# Patient Record
Sex: MALE | Race: WHITE | URBAN - METROPOLITAN AREA
[De-identification: names, ages, dates, MRNs, and addresses within clinical notes are randomized per-mention and may not be internally consistent; named-entity substitution may affect disease eponyms.]

---

## 2018-11-04 ENCOUNTER — HOSPITAL ENCOUNTER (OUTPATIENT)
Dept: ONCOLOGY | Facility: HOSPITAL | Age: 53
End: 2018-11-05
Attending: INTERNAL MEDICINE | Admitting: INTERNAL MEDICINE

## 2018-11-04 LAB
ABS NEUT (OLG): 15.34 X10(3)/MCL (ref 2.1–9.2)
ALBUMIN SERPL-MCNC: 3.5 GM/DL (ref 3.4–5)
ALBUMIN/GLOB SERPL: 0.9 {RATIO}
ALP SERPL-CCNC: 95 UNIT/L (ref 50–136)
ALT SERPL-CCNC: 31 UNIT/L (ref 12–78)
AMPHET UR QL SCN: NORMAL
APPEARANCE, UA: CLEAR
AST SERPL-CCNC: 23 UNIT/L (ref 15–37)
BACTERIA SPEC CULT: ABNORMAL /HPF
BARBITURATE SCN PRESENT UR: NORMAL
BASOPHILS # BLD AUTO: 0 X10(3)/MCL (ref 0–0.2)
BASOPHILS NFR BLD AUTO: 0 %
BENZODIAZ UR QL SCN: NORMAL
BILIRUB SERPL-MCNC: 0.4 MG/DL (ref 0.2–1)
BILIRUB UR QL STRIP: NEGATIVE
BILIRUBIN DIRECT+TOT PNL SERPL-MCNC: 0.1 MG/DL (ref 0–0.2)
BILIRUBIN DIRECT+TOT PNL SERPL-MCNC: 0.3 MG/DL (ref 0–0.8)
BUN SERPL-MCNC: 12 MG/DL (ref 7–18)
CALCIUM SERPL-MCNC: 9.1 MG/DL (ref 8.5–10.1)
CANNABINOIDS UR QL SCN: NORMAL
CHLORIDE SERPL-SCNC: 107 MMOL/L (ref 98–107)
CO2 SERPL-SCNC: 31 MMOL/L (ref 21–32)
COCAINE UR QL SCN: NORMAL
COLOR UR: YELLOW
CREAT SERPL-MCNC: 1.06 MG/DL (ref 0.7–1.3)
EOSINOPHIL # BLD AUTO: 0.1 X10(3)/MCL (ref 0–0.9)
EOSINOPHIL NFR BLD AUTO: 0 %
ERYTHROCYTE [DISTWIDTH] IN BLOOD BY AUTOMATED COUNT: 12.4 % (ref 11.5–17)
ETHANOL SERPL-MCNC: <3 MG/DL (ref 0–3)
GLOBULIN SER-MCNC: 3.7 GM/DL (ref 2.4–3.5)
GLUCOSE (UA): NEGATIVE
GLUCOSE SERPL-MCNC: 132 MG/DL (ref 74–106)
HCT VFR BLD AUTO: 47.6 % (ref 42–52)
HGB BLD-MCNC: 15.6 GM/DL (ref 14–18)
HGB UR QL STRIP: ABNORMAL
KETONES UR QL STRIP: ABNORMAL
LEUKOCYTE ESTERASE UR QL STRIP: NEGATIVE
LYMPHOCYTES # BLD AUTO: 2.5 X10(3)/MCL (ref 0.6–4.6)
LYMPHOCYTES NFR BLD AUTO: 13 %
MCH RBC QN AUTO: 32.6 PG (ref 27–31)
MCHC RBC AUTO-ENTMCNC: 32.8 GM/DL (ref 33–36)
MCV RBC AUTO: 99.4 FL (ref 80–94)
MONOCYTES # BLD AUTO: 1.2 X10(3)/MCL (ref 0.1–1.3)
MONOCYTES NFR BLD AUTO: 6 %
NEUTROPHILS # BLD AUTO: 15.34 X10(3)/MCL (ref 2.1–9.2)
NEUTROPHILS NFR BLD AUTO: 80 %
NITRITE UR QL STRIP: NEGATIVE
OPIATES UR QL SCN: NORMAL
PCP UR QL: NORMAL
PH UR STRIP.AUTO: 6.5 [PH] (ref 5–7.5)
PH UR STRIP: 6.5 [PH] (ref 5–9)
PLATELET # BLD AUTO: 197 X10(3)/MCL (ref 130–400)
PMV BLD AUTO: 11.2 FL (ref 9.4–12.4)
POTASSIUM SERPL-SCNC: 5 MMOL/L (ref 3.5–5.1)
PROT SERPL-MCNC: 7.2 GM/DL (ref 6.4–8.2)
PROT UR QL STRIP: NEGATIVE
RBC # BLD AUTO: 4.79 X10(6)/MCL (ref 4.7–6.1)
RBC #/AREA URNS HPF: 5 /HPF (ref 0–2)
SODIUM SERPL-SCNC: 143 MMOL/L (ref 136–145)
SP GR FLD REFRACTOMETRY: 1.02 (ref 1–1.03)
SP GR UR STRIP: 1.02 (ref 1–1.03)
SQUAMOUS EPITHELIAL, UA: ABNORMAL
UROBILINOGEN UR STRIP-ACNC: 1
WBC # SPEC AUTO: 19.3 X10(3)/MCL (ref 4.5–11.5)
WBC #/AREA URNS HPF: ABNORMAL /HPF

## 2018-11-05 LAB
ABS NEUT (OLG): 5.54 X10(3)/MCL (ref 2.1–9.2)
ALBUMIN SERPL-MCNC: 3.1 GM/DL (ref 3.4–5)
ALBUMIN/GLOB SERPL: 1.1 RATIO (ref 1.1–2)
ALP SERPL-CCNC: 91 UNIT/L (ref 50–136)
ALT SERPL-CCNC: 25 UNIT/L (ref 12–78)
AST SERPL-CCNC: 25 UNIT/L (ref 15–37)
BASOPHILS # BLD AUTO: 0 X10(3)/MCL (ref 0–0.2)
BASOPHILS NFR BLD AUTO: 0 %
BILIRUB SERPL-MCNC: 0.7 MG/DL (ref 0.2–1)
BILIRUBIN DIRECT+TOT PNL SERPL-MCNC: 0.2 MG/DL (ref 0–0.5)
BILIRUBIN DIRECT+TOT PNL SERPL-MCNC: 0.5 MG/DL (ref 0–0.8)
BUN SERPL-MCNC: 10 MG/DL (ref 7–18)
CALCIUM SERPL-MCNC: 8.5 MG/DL (ref 8.5–10.1)
CHLORIDE SERPL-SCNC: 107 MMOL/L (ref 98–107)
CO2 SERPL-SCNC: 29 MMOL/L (ref 21–32)
CREAT SERPL-MCNC: 0.72 MG/DL (ref 0.7–1.3)
EOSINOPHIL # BLD AUTO: 0.1 X10(3)/MCL (ref 0–0.9)
EOSINOPHIL NFR BLD AUTO: 1 %
ERYTHROCYTE [DISTWIDTH] IN BLOOD BY AUTOMATED COUNT: 12.6 % (ref 11.5–17)
GLOBULIN SER-MCNC: 2.7 GM/DL (ref 2.4–3.5)
GLUCOSE SERPL-MCNC: 93 MG/DL (ref 74–106)
HCT VFR BLD AUTO: 44.9 % (ref 42–52)
HGB BLD-MCNC: 14.6 GM/DL (ref 14–18)
LYMPHOCYTES # BLD AUTO: 2.3 X10(3)/MCL (ref 0.6–4.6)
LYMPHOCYTES NFR BLD AUTO: 26 %
MAGNESIUM SERPL-MCNC: 2.3 MG/DL (ref 1.8–2.4)
MCH RBC QN AUTO: 32.2 PG (ref 27–31)
MCHC RBC AUTO-ENTMCNC: 32.5 GM/DL (ref 33–36)
MCV RBC AUTO: 98.9 FL (ref 80–94)
MONOCYTES # BLD AUTO: 0.7 X10(3)/MCL (ref 0.1–1.3)
MONOCYTES NFR BLD AUTO: 8 %
NEUTROPHILS # BLD AUTO: 5.54 X10(3)/MCL (ref 2.1–9.2)
NEUTROPHILS NFR BLD AUTO: 64 %
PHOSPHATE SERPL-MCNC: 3.2 MG/DL (ref 2.5–4.9)
PLATELET # BLD AUTO: 173 X10(3)/MCL (ref 130–400)
PMV BLD AUTO: 11 FL (ref 9.4–12.4)
POTASSIUM SERPL-SCNC: 4.1 MMOL/L (ref 3.5–5.1)
PROT SERPL-MCNC: 5.8 GM/DL (ref 6.4–8.2)
RBC # BLD AUTO: 4.54 X10(6)/MCL (ref 4.7–6.1)
SODIUM SERPL-SCNC: 142 MMOL/L (ref 136–145)
WBC # SPEC AUTO: 8.6 X10(3)/MCL (ref 4.5–11.5)

## 2018-11-11 LAB
FINAL CULTURE: NORMAL
FINAL CULTURE: NORMAL

## 2022-04-11 ENCOUNTER — HISTORICAL (OUTPATIENT)
Dept: ADMINISTRATIVE | Facility: HOSPITAL | Age: 57
End: 2022-04-11

## 2022-04-27 VITALS
HEIGHT: 71 IN | DIASTOLIC BLOOD PRESSURE: 78 MMHG | SYSTOLIC BLOOD PRESSURE: 126 MMHG | BODY MASS INDEX: 25.65 KG/M2 | WEIGHT: 183.19 LBS

## 2022-04-30 NOTE — ED PROVIDER NOTES
Patient:   Sbe Street             MRN: [id number]            FIN: [id number]               Age:   53 years     Sex:  Male     :  1965   Associated Diagnoses:   Seizure-like activity   Author:   Santiago Davila MD      Basic Information   Time seen: Date & time 2018 05:28:00.   History source: Patient, spouse, EMS.   Arrival mode: Ambulance.   History limitation: None.      History of Present Illness   Presents with 54 y/o white male presents to ED via EMS following a seizure. Wife reports the patient was laying in bed when he began to have jerky movements then proceeded to open his eyes however he would not focus on her. Wife reports the patient was incoherent when EMS arrived. Patient reports neck pain, back pain, calf pain, and chest pain. Patient reports exacerbating factors include deep breathing. Patient denies hx of seizures, CVA, and head trauma.  and Patient works as a  offshore.  The onset was just prior to arrival.  The occurrence was single episode.  Witnessed: by family.  The location where the incident occurred was at home.  The character of symptoms is generalized.  The Postictal symptoms is confusion.  The exacerbating factor is none.  Risk factors consist of none.  Therapy today: emergency medical services.  Associated symptoms: chest pain, confusion and back pain.  Associated injury: bit tongue.  Additional history: none.        Review of Systems   Constitutional symptoms:  No fever, no chills, no sweats, no weakness.    Skin symptoms:  No rash, no pruritus.    Eye symptoms:  Vision unchanged.   ENMT symptoms:  No sore throat, no nasal congestion.    Respiratory symptoms:  No shortness of breath, no cough.    Cardiovascular symptoms:  Chest pain, no palpitations, no tachycardia.    Gastrointestinal symptoms:  No abdominal pain, no nausea, no vomiting, no diarrhea, no constipation.    Genitourinary symptoms:  No dysuria, no hematuria.    Musculoskeletal  symptoms:  Back pain, No Muscle pain,    Neurologic symptoms:  Seizure, no headache, no dizziness, no numbness, no tingling, no weakness.              Additional review of systems information: All other systems reviewed and otherwise negative, All systems reviewed as documented in chart.      Health Status   Allergies: No known allergies.      Past Medical/ Family/ Social History   Medical history: Negative.   Surgical history: Negative.   Family history:     seizure.   Social history:    Social & Psychosocial Habits    Tobacco  11/04/2018  Use: Unknown if ever smoked    Smoking Cessation Counseling No.      Physical Examination               Vital Signs   Vital Signs   11/4/2018 1:43 CDT       Temperature Oral          36.5 DegC                             Temperature Oral (calculated)             97.70 DegF                             Peripheral Pulse Rate     97 bpm                             Respiratory Rate          16 br/min                             SpO2                      95 %                             Systolic Blood Pressure   121 mmHg                             Diastolic Blood Pressure  74 mmHg  .   Measurements   11/4/2018 1:43 CDT       Weight Dosing             84 kg                             Weight Measured           84 kg                             Weight Measured and Calculated in Lbs     185.19 lb  .   Basic Oxygen Information   11/4/2018 5:00 CST       SpO2                      95 %                             Oxygen Therapy            Room air  .   General:  Alert, no acute distress.    Howe coma scale:  Eye response: 4 /4, verbal response: 5 /5, motor response: 6 /6, Total score: Total score: 15.    Neurological:  Alert and oriented to person, place, time, and situation, No focal neurological deficit observed, normal motor observed.    Skin:  Warm, dry, no rash.    Head:  Normocephalic, atraumatic.    Neck:  Supple, trachea midline, no tenderness.    Eye:  Extraocular movements are  intact, vision unchanged.    Ears, nose, mouth and throat:  Oral mucosa moist, abrasion to right distal aspect of tongue.    Respiratory:  Lungs are clear to auscultation, respirations are non-labored, breath sounds are equal, Symmetrical chest wall expansion.    Cardiovascular:  Regular rate and rhythm, No murmur, Normal peripheral perfusion.    Gastrointestinal:  Soft, Nontender, Non distended, Normal bowel sounds, No organomegaly.    Musculoskeletal:  Normal ROM, normal strength.    Psychiatric:  Cooperative, appropriate mood & affect.       Medical Decision Making   Documents reviewed:  Emergency department nurses' notes.   Orders  Launch Order Profile (Selected)   Inpatient Orders  Ordered  Discharge Planning Ongoing Assessment: 11/07/18 9:00:00 CST, q3day  Ordered (Exam Completed)  CT Head W/O Contrast: Stat, 11/04/18 2:30:00 CST, Seizures, seizure, None, Stretcher, Rad Type, Schedule this test, 11/04/18 2:30:00 CST  Completed  Automated Diff: Routine collect, 11/04/18 1:14:00 CST, Blood, Collected, by CLIENT, Stop date 11/04/18 1:14:00 CST, Lab Collect, Venous Draw, Print Label By Order Location  CBC w/ Auto Diff: Routine collect, 11/04/18 1:14:00 CST, Blood, Collected, by CLIENT, Stop date 11/04/18 1:14:00 CST, Lab Collect, Venous Draw, Print Label By Order Location  Comprehensive Metabolic Panel: Routine collect, 11/04/18 1:14:00 CST, Blood, Collected, by CLIENT, Stop date 11/04/18 1:14:00 CST, Lab Collect, Venous Draw, Print Label By Order Location  EKG Adult 12 Lead: 11/04/18 3:02:00 CST, Stat, Once, 11/04/18 3:02:00 CST, -1, -1, 11/04/18 3:02:00 CST  Estimated Glomerular Filtration Rate: Routine collect, 11/04/18 1:14:00 CST, Blood, Collected, by CLIENT, Stop date 11/04/18 1:14:00 CST, Lab Collect, Venous Draw, Print Label By Order Location  Ethanol Level: Routine collect, 11/04/18 1:14:00 CST, Blood, Collected, by CLIENT, Stop date 11/04/18 1:14:00 CST, Lab Collect, Venous Draw, Print Label By Order  Location.   Electrocardiogram:  Time 11/4/2018 07:02:00, rate 88, normal sinus rhythm, No ST-T changes, no ectopy, normal NM & QRS intervals, EP Interp.    Results review:  Lab results : Lab View   11/4/2018 1:14 CST       Sodium Lvl                143 mmol/L                             Potassium Lvl             5.0 mmol/L                             Chloride                  107 mmol/L                             CO2                       31.0 mmol/L                             Calcium Lvl               9.1 mg/dL                             Glucose Lvl               132 mg/dL  HI                             BUN                       12.0 mg/dL                             Creatinine                1.06 mg/dL                             eGFR-AA                   >60 mL/min/1.73 m2  NA                             eGFR-NINA                  >60 mL/min/1.73 m2  NA                             Bili Total                0.4 mg/dL                             Bili Direct               0.10 mg/dL                             Bili Indirect             0.30 mg/dL                             AST                       23 unit/L                             ALT                       31 unit/L                             Alk Phos                  95 unit/L                             Total Protein             7.2 gm/dL                             Albumin Lvl               3.50 gm/dL                             Globulin                  3.70 gm/dL  HI                             A/G Ratio                 0.9  NA                             WBC                       19.3 x10(3)/mcL  HI                             RBC                       4.79 x10(6)/mcL                             Hgb                       15.6 gm/dL                             Hct                       47.6 %                             Platelet                  197 x10(3)/mcL                             MCV                       99.4 fL  HI                              MCH                       32.6 pg  HI                             MCHC                      32.8 gm/dL  LOW                             RDW                       12.4 %                             MPV                       11.2 fL                             Abs Neut                  15.34 x10(3)/mcL  HI                             Neutro Auto               80 %  NA                             Lymph Auto                13 %  NA                             Mono Auto                 6 %  NA                             Eos Auto                  0 %  NA                             Abs Eos                   0.1 x10(3)/mcL                             Basophil Auto             0 %  NA                             Abs Neutro                15.34 x10(3)/mcL  HI                             Abs Lymph                 2.5 x10(3)/mcL                             Abs Mono                  1.2 x10(3)/mcL                             Abs Baso                  0.0 x10(3)/mcL                             Ethanol Lvl               <3.0 mg/dL  .   Radiology results:  Reviewed radiologist's report, No acute intracranial changes seen per radiology.       Reexamination/ Reevaluation   Time: 11/4/2018 06:13:00 .   Course: well controlled, Patient is alert and oriented, he is in no apparent distress., He does complain of some mild chest pain with inspiration.  He denies any shortness breath.  No nausea vomiting., UA is pending at this time.      Impression and Plan   Diagnosis   Seizure-like activity ([id number] R56.9)      Calls-Consults   -  11/4/2018 06:35:00 , Augusto WOODARD, Tracey KNAPP, Will see in consultation is admitted, get MRI with and without contrast with epilepsy protocol and EEG, does not recommend anti-seizure medicine at this time..    -  11/4/2018 07:02:00 , Yadira CISNEROS, okay to admit.    Plan   Condition: Stable.    Disposition: Admit time  11/4/2018 07:04:00, Place in Observation Unit.    Counseled: Patient, Family, Regarding  diagnosis, Regarding diagnostic results, Regarding treatment plan, Patient indicated understanding of instructions.    Notes: I, Davina Green, acted solely as a scribe for and in the presence of Dr. Davila who performed the service. , I acknowledged that the documentation on this chart was provided by a scribe on the date of service noted above and that the documentation in the chart accurately reflects work and decisions made by me alone..

## 2022-04-30 NOTE — H&P
Patient:   Seb Street             MRN: [id number]            FIN: [id number]               Age:   53 years     Sex:  Male     :  1965   Associated Diagnoses:   None   Author:   Liza Doll MD      Basic Information   Source of history:  Self, Family member, Medical record.    Present at bedside:  Family member, Medical personnel.    Referral source:  Emergency department.    History limitation:  None.    Provider information/ cc:    PCP: NONE  ADVANCED DIRECTIVES: NONE  CODE STATUS: FULL.       Chief Complaint   2018 1:43 CDT       SEIZURE WHILE IN BED, GCS=15, NO PMH, C/O CP UPON TAKING DEEP BREATH( TRIAGE COPIED FROM DOWN TIME FORM ORIGINALLYWRITTEN BY SHAYLA EVANS)      History of Present Illness   53-year-old  male presents to the ED via EMS status post acute seizure activity while in bed.  Family reported in the ED patient was in the bed sleeping when he began to have jerky movements to his upper and lower extremities with his eyes open and was nonverbal at that time.  911 was called for ED transport.  No reports of new foods or medications.  No reports of chest pain, shortness of breath, respiratory failure, nausea, vomiting, diarrhea, fever, chills, or any sick contacts.  Patient does not have a history of seizures, CVA, or any traumatic head or brain injury in the past.  Post seizure activity patient was reported to be confused however he is awake alert oriented ×4 at the time of rounds.  Patient does have complaints of tongue pain which it is noted patient did bite his tongue; he also reports of neck pain, back pain, pain to his lower extremities, and reproducible pleuritic chest pain. No acute intracranial process identified.  Chest x-ray done revealed no acute pulmonary disease.  Neurology services were consulted per ED provider with recommendations on no anti-epileptic medication ; orders for MRI of the brain with and without contrast and EEG were done.  Patient reports he  "smokes one pack a cigarettes a day, he denies any illicit drug use; he does report heavy alcohol use on a daily basis in the past however he reports he has not drank daily in over 10+ years.  Patient does admit to drinking "a lot of beers" yesterday while watching the football game.  Patient is admitted to hospital medicine services for further management.  Neurology services have been consulted.  VS /66 pulse 62 respirations 18 temperature 36.8°C O2 saturation 98% on room air      Review of Systems   Except as document, all other systems reviewed and negative      Health Status   Allergies:    Allergic Reactions (Selected)  No Known Allergies   Current medications:  (Selected)   Inpatient Medications  Ordered  Sodium Chloride 0.9% intravenous solution 1,000 mL: 1,000 mL, 1,000 mL, IV, 60 mL/hr, start date 11/04/18 10:21:00 CST  Zofran: 4 mg, form: Injection, IV Push, q4hr PRN for nausea, first dose 11/04/18 10:21:00 CST, choose first if ordered with other treatments for nausea  labetalol: 20 mg, form: Soln, IV Push, q2hr PRN for hypertension, first dose 11/04/18 10:21:00 CST, SBP > 160  and/or DBP > 90  not to exceed 300mg/24hrs      Histories   Past Medical History:    No active or resolved past medical history items have been selected or recorded.   Family History:   Negative to present medical condition   Procedure history:   negative   Social History     Drinks alcohol socially   Denies any illicit drug use  Smokes 1 pack of cigarettes a day  Lives with family.        Physical Examination      Vital Signs (last 24 hrs)_____  Last Charted___________  Temp Oral     36.8 DegC  (NOV 04 10:10)  Heart Rate Peripheral   62 bpm  (NOV 04 10:10)  Resp Rate         18 br/min  (NOV 04 10:10)  SBP      118 mmHg  (NOV 04 10:10)  DBP      66 mmHg  (NOV 04 10:10)  SpO2      98 %  (NOV 04 10:10)  Weight      83 kg  (NOV 04 09:27)  Height      177.8 cm  (NOV 04 09:27)  BMI      26.26  (NOV 04 09:27)   General:  Alert and " oriented, No acute distress, Appears stated age, nontoxic appearance; family member at bedside.    Eye:  Pupils are equal, round and reactive to light, Extraocular movements are intact, Normal conjunctiva, Vision unchanged.    HENT:  Normocephalic, Oral mucosa is moist, No pharyngeal erythema.    Neck:  Supple, Non-tender, No jugular venous distention.    Respiratory:  Lungs are clear to auscultation, Respirations are non-labored, Breath sounds are equal, Symmetrical chest wall expansion.    Cardiovascular:  Normal rate, Regular rhythm, S1, S2, No gallop, Normal peripheral perfusion.         Capillary refill: Less than 2 seconds.    Gastrointestinal:  Soft, Non-tender, Non-distended, Normal bowel sounds.    Genitourinary:  No costovertebral angle tenderness.    Musculoskeletal:  Moves upper and lower extremities freely and on command.    Integumentary:  Warm, Dry, Pink.    Neurologic:  Alert, Oriented, Normal sensory, Normal motor function, No focal deficits, Cranial Nerves II-XII are grossly intact.    Psychiatric:  Cooperative, Appropriate mood & affect.    Cognition and Speech:  Speech clear and coherent.       Review / Management   Laboratory Results   Today's Lab Results : PowerNote Discrete Results   11/4/2018 6:23 CST       UA Appear                 CLEAR                             UA Color                  YELLOW                             UA Spec Grav              1.021                             UA Bili                   Negative                             UA pH                     6.5                             UA Urobilinogen           1.0                             UA Blood                  Trace                             UA Glucose                Negative                             UA Ketones                Trace                             UA Protein                Negative                             UA Nitrite                Negative                             UA Leuk Est                Negative                             UA WBC                    NONE SEEN /HPF                             UA RBC                    5 /HPF  HI                             UA Bacteria               NONE SEEN /HPF                             UA Squam Epithelial       NONE SEEN                             U pH                      6.5                             U Spec Grav               1.021                             U Amph Scr                NEG                             U Radha Scr                NEG                             U Benzodia Scr            NEG                             U Cannab Scr              NEG                             U Cocaine Scr             NEG                             U Opiate Scr              NEG                             U Phencyclidine Scr       NEG    11/4/2018 1:14 CST       WBC                       19.3 x10(3)/mcL  HI                             RBC                       4.79 x10(6)/mcL                             Hgb                       15.6 gm/dL                             Hct                       47.6 %                             Platelet                  197 x10(3)/mcL                             MCV                       99.4 fL  HI                             MCH                       32.6 pg  HI                             MCHC                      32.8 gm/dL  LOW                             RDW                       12.4 %                             MPV                       11.2 fL                             Abs Neut                  15.34 x10(3)/mcL  HI                             Neutro Auto               80 %  NA                             Lymph Auto                13 %  NA                             Mono Auto                 6 %  NA                             Eos Auto                  0 %  NA                             Abs Eos                   0.1 x10(3)/mcL                             Basophil Auto             0 %  NA                              Abs Neutro                15.34 x10(3)/mcL  HI                             Abs Lymph                 2.5 x10(3)/mcL                             Abs Mono                  1.2 x10(3)/mcL                             Abs Baso                  0.0 x10(3)/mcL                             Sodium Lvl                143 mmol/L                             Potassium Lvl             5.0 mmol/L                             Chloride                  107 mmol/L                             CO2                       31.0 mmol/L                             Calcium Lvl               9.1 mg/dL                             Glucose Lvl               132 mg/dL  HI                             BUN                       12.0 mg/dL                             Creatinine                1.06 mg/dL                             eGFR-AA                   >60 mL/min/1.73 m2  NA                             eGFR-NINA                  >60 mL/min/1.73 m2  NA                             Bili Total                0.4 mg/dL                             Bili Direct               0.10 mg/dL                             Bili Indirect             0.30 mg/dL                             AST                       23 unit/L                             ALT                       31 unit/L                             Alk Phos                  95 unit/L                             Total Protein             7.2 gm/dL                             Albumin Lvl               3.50 gm/dL                             Globulin                  3.70 gm/dL  HI                             A/G Ratio                 0.9  NA                             Ethanol Lvl               <3.0 mg/dL        Chest x-ray results   Reviewed radiologist's report,   CXR 2 views  Reason For Exam  Chest Pain    Radiology Report     CHEST X-RAYS (2 Views):     CPT: 60997     HISTORY:  Chest Pain     FINDINGS:  Examination reveals mediastinal and cardiac silhouettes to be within  normal limits. Lung fields are  clear and free of gross infiltrates  atelectases or effusions.     IMPRESSION: No active pulmonary disease       Signature Line  Electronically Signed By: William Blanco MD  Date/Time Signed: 11/04/2018 09:02   Documentation reviewed:  Reviewed prior records, Reviewed home medications.    Condition:  Fair.       Impression and Plan   Diagnosis       Acute seizures  -new onset  -CT head without contrast on 11/4/2018= await official results  -seizure precautions  -fall precautions  -neuro consult  -Ativan IV when necessary  -MRI brain with and without contrast= pending    Leukocytosis  -no source of infection identified  -likely secondary to seizures  -Blood cultures ×2 on 11/4/2018= pending  -cxr = wnl  -Urine analysis on 11/4/2018= indication of UTI    Tobacco abuse  -counseled  -nicotine patch if desires    Hx of ETOH abuse  -pt reports he has nto drank heavy in 10+ years  -Thiamine, folic acid and multivitamin daily  -Ativan IV when necessary      Mediatoin for MRI testing. Neuro consult. Will await testing results and neuro recommendations. AM labs.     FULL CODE STATUS  GI/DVT PROPHYLAXIS= PPI/SCDs  PCP: NONE    I, Torey Shaffer APRN, FNP, discussed the case with Dr. KRISTEN Doll. .     Orders     Orders   Patient Care:  Seizure Precautions (Order): 11/4/2018 12:03 CST, Constant Order.     Education and Follow-up:       Counseled: Patient, Family, Regarding diagnosis, Regarding treatment, Regarding medications.

## 2022-04-30 NOTE — DISCHARGE SUMMARY
Patient:   Seb Street             MRN: [id number]            FIN: [id number]               Age:   53 years     Sex:  Male     :  1965   Associated Diagnoses:   None   Author:   Ana Calvillo MD      Basic Information   Source of history:  Self, Family member, Medical record.    Present at bedside:  Family member.    Referral source:  Emergency department.    History limitation:  None.    Provider information/ cc:    PCP: NONE  ADVANCED DIRECTIVES: NONE  CODE STATUS: FULL.       Chief Complaint      History of Present Illness   2018: 53-year-old  male presents to the ED via EMS status post acute seizure activity while in bed.  Family reported in the ED patient was in the bed sleeping when he began to have jerky movements to his upper and lower extremities with his eyes open and was nonverbal at that time.  911 was called for ED transport.  No reports of new foods or medications.  No reports of chest pain, shortness of breath, respiratory failure, nausea, vomiting, diarrhea, fever, chills, or any sick contacts.  Patient does not have a history of seizures, CVA, or any traumatic head or brain injury in the past.  Post seizure activity patient was reported to be confused however he is awake alert oriented ×4 at the time of rounds.  Patient does have complaints of tongue pain which it is noted patient did bite his tongue; he also reports of neck pain, back pain, pain to his lower extremities, and reproducible pleuritic chest pain. No acute intracranial process identified.  Chest x-ray done revealed no acute pulmonary disease.  Neurology services were consulted per ED provider with recommendations on no anti-epileptic medication ; orders for MRI of the brain with and without contrast and EEG were done.  Patient reports he smokes one pack a cigarettes a day, he denies any illicit drug use; he does report heavy alcohol use on a daily basis in the past however he reports he has not drank daily  "in over 10+ years.  Patient does admit to drinking "a lot of beers" yesterday while watching the football game.  Patient is admitted to hospital medicine services for further management.  Neurology services have been consulted.  VS /66 pulse 62 respirations 18 temperature 36.8°C O2 saturation 98% on room air    11-5-2018:  No seizure activity since admit.  He is aware that he should not drive until seizure free for 6 months.  He has been seen by neurology today and Dr. Casas has cleared him to go home.  EEG was abnormal suggesting a predispoosition for future seizure activity.  He is back to his baseline neurologically.      Review of Systems   Except as document, all other systems reviewed and negative   Constitutional:  No weakness, No fatigue.    Respiratory:  No shortness of breath, No cough.    Cardiovascular:  No chest pain, No peripheral edema.    Gastrointestinal:  No nausea, No vomiting, No diarrhea.    Musculoskeletal:  No joint pain.    Neurologic:  Alert and oriented X4, No confusion, No numbness, No tingling, No headache.    Psychiatric:  No anxiety, No depression.       Health Status   Allergies:    Allergic Reactions (Selected)  No Known Allergies,    Allergies (1) Active Reaction  No Known Allergies None Documented   Current medications:  (Selected)   Inpatient Medications  Ordered  Ativan: 2 mg, form: Injection, IV Push, q4hr PRN for seizure activity, first dose 11/04/18 15:53:00 CST  Keppra: 750 mg, form: Tab-ER, Oral, BID, first dose 11/04/18 21:00:00 CST  MVI with Minerals (Adult Tab): 1 tab(s), form: Tab, Oral, Daily, first dose 11/04/18 15:53:00 CST, Waste Code BKC  Protonix: 40 mg, form: Tab-EC, Oral, Daily, first dose 11/05/18 6:00:00 CST  Sodium Chloride 0.9% intravenous solution 1,000 mL: 1,000 mL, 1,000 mL, IV, 60 mL/hr, start date 11/04/18 10:21:00 CST  Zofran: 4 mg, form: Injection, IV Push, q4hr PRN for nausea, first dose 11/04/18 10:21:00 CST, choose first if ordered with other " treatments for nausea  folic acid 1 mg oral tablet: 1 mg, form: Tab, Oral, Daily, first dose 11/05/18 9:00:00 CST  labetalol: 20 mg, form: Soln, IV Push, q2hr PRN for hypertension, first dose 11/04/18 10:21:00 CST, SBP > 160  and/or DBP > 90  not to exceed 300mg/24hrs  thiamine: 100 mg, form: Tab, Oral, Daily, first dose 11/05/18 9:00:00 CST  Prescriptions  Prescribed  Keppra 750 mg oral tablet: 750 mg = 1 tab(s), Oral, BID, # 60 tab(s), 4 Refill(s)  Protonix 40 mg ORAL enteric coated tablet: 40 mg = 1 tab(s), Oral, Daily, # 30 tab(s), 4 Refill(s)  folic acid 1 mg oral tablet: 1 mg = 1 tab(s), Oral, Daily, # 30 tab(s), 2 Refill(s)  thiamine 100 mg oral tablet: 100 mg = 1 tab(s), Oral, Daily, # 30 tab(s), 2 Refill(s),    Medications (9) Active  Scheduled: (5)  folic acid 1 mg Tab UD  1 mg 1 tab(s), Oral, Daily  levetiracetam Tab-ER-500mg  750 mg 1.5 tab(s), Oral, BID  MultiVit(THERAGRAN-M Tab) with Minerals Tab  1 tab(s), Oral, Daily  pantoprazole 40 mg EC Tab  40 mg 1 tab(s), Oral, Daily  thiamine 100 mg (Vit B1) Tab UD  100 mg 1 tab(s), Oral, Daily  Continuous: (1)  sodium chloride 0.9% 1,000 mL  1,000 mL, IV, 60 mL/hr  PRN: (3)  labetalol 5 mg/mL 20mL vial  20 mg 4 mL, IV Push, q2hr  LORazepam 2 mg/ml Inj  2 mg 1 mL, IV Push, q4hr  ondansetron 2 mg/mL inj - 2mL  4 mg 2 mL, IV Push, q4hr   Problem list:    All Problems  Tobacco user / SNOMED CT [id number] / Confirmed,    Active Problems (1)  Tobacco user       Histories   Past Medical History:    No active or resolved past medical history items have been selected or recorded.   Family History:    No family history items have been selected or recorded.,   Negative to present medical condition   Procedure history:    No active procedure history items have been selected or recorded.,   negative   Social History        Social & Psychosocial Habits    Alcohol  11/04/2018  Use: Current    Type: Beer    Frequency: 1-2 times per month    Tobacco  11/04/2018  Use: Unknown if  ever smoked    Smoking Cessation Counseling No    11/04/2018  Use: Current every day smoker    Type: Cigarettes    Smoking Cessation Counseling Yes    Tobacco use per day: 1    Number of years: 35    Ready to change: No    Concerns about tobacco use in household: No.     Drinks alcohol socially   Denies any illicit drug use  Smokes 1 pack of cigarettes a day  Lives with family.        Physical Examination      Vital Signs (last 24 hrs)_____  Last Charted___________  Temp Oral     36.7 DegC  (NOV 05 15:13)  Heart Rate Peripheral   61 bpm  (NOV 05 15:13)  SBP      106 mmHg  (NOV 05 15:13)  DBP      69 mmHg  (NOV 05 15:13)  SpO2      95 %  (NOV 05 15:13)   General:  Alert and oriented, No acute distress, Appears stated age, nontoxic appearance; family members at bedside.    Eye:  Pupils are equal, round and reactive to light, Extraocular movements are intact, Normal conjunctiva, Vision unchanged.    HENT:  Normocephalic, Oral mucosa is moist, No pharyngeal erythema.    Neck:  Supple, Non-tender, No jugular venous distention.    Respiratory:  Lungs are clear to auscultation, Respirations are non-labored, Breath sounds are equal, Symmetrical chest wall expansion.    Cardiovascular:  Normal rate, Regular rhythm, No edema.    Gastrointestinal:  Soft, Non-tender, Non-distended.    Musculoskeletal:  Normal range of motion, Normal strength, Moves upper and lower extremities freely and on command.    Integumentary:  Warm, Dry.    Neurologic:  Alert, Oriented, Normal sensory, Normal motor function, No focal deficits, Cranial Nerves II-XII are grossly intact.    Psychiatric:  Cooperative, Appropriate mood & affect, Normal judgment.    Cognition and Speech:  Oriented, Speech clear and coherent, Functional cognition intact.       Health Maintenance      Health Maintenance     Pending (in the next year)        Due            ADL Screening due  11/05/18  and every 1  year(s)           Alcohol Misuse Screening due  11/05/18  and  every 1  year(s)           Aspirin Therapy for CVD Prevention due  11/05/18  and every 1  year(s)           Smoking Cessation due  11/05/18  Variable frequency           Tetanus Vaccine due  11/05/18  and every 10  year(s)        Due In Future            Obesity Screening not due until  11/04/19  and every 1  year(s)     Satisfied (in the past 1 year)        Satisfied            Blood Pressure Screening on  11/05/18.  Satisfied by Shabana Griffith C.N.A.           Body Mass Index Check on  11/04/18.  Satisfied by Sonal Mendoza RN           Diabetes Screening on  11/05/18.  Satisfied by Vero Hernandez           Influenza Vaccine on  11/04/18.  Satisfied by Kati Joy RN           Obesity Screening on  11/04/18.  Satisfied by Sonal Mendoza RN        Review / Management   Results review:     Labs (Last four charted values)  WBC                  8.6 (NOV 05) H 19.3 (NOV 04)   Hgb                  14.6 (NOV 05) 15.6 (NOV 04)   Hct                  44.9 (NOV 05) 47.6 (NOV 04)   Plt                  173 (NOV 05) 197 (NOV 04)   Na                   142 (NOV 05) 143 (NOV 04)   K                    4.1 (NOV 05) 5.0 (NOV 04)   CO2                  29.0 (NOV 05) 31.0 (NOV 04)   Cl                   107 (NOV 05) 107 (NOV 04)   Cr                   0.72 (NOV 05) 1.06 (NOV 04)   BUN                  10.0 (NOV 05) 12.0 (NOV 04)   Glucose Random       93 (NOV 05) H 132 (NOV 04) .    Laboratory Results   Chest x-ray results   Reviewed radiologist's report,   CXR 2 views  Reason For Exam  Chest Pain    Radiology Report     CHEST X-RAYS (2 Views):     CPT: 58734     HISTORY:  Chest Pain     FINDINGS:  Examination reveals mediastinal and cardiac silhouettes to be within  normal limits. Lung fields are clear and free of gross infiltrates  atelectases or effusions.     IMPRESSION: No active pulmonary disease       Signature Line  Electronically Signed By: William Blanco MD  Date/Time Signed: 11/04/2018 09:02    Radiology results   Rad Results (ST)   Accession: [id number]  Order: CT Head W W/O Contrast  Report Dt/Tm: 11/05/2018 12:50  Report:   History: Seizure  Comparison studies:  Head CT without contrast 11/4/2018     Technique:   Axial scans were obtained from skull base to the vertex WITH and  WITHOUT the demonstration of intravenous contrast.  Coronal and sagittal reconstructions obtained from the axial data.   Contrast: None  Automatic exposure control was utilized to limit radiation dose.     Radiation Dose:  Total DLP: 2180 mGy*cm     DISCUSSION: As below.     IMPRESSION:     1. No acute abnormalities.     2. Specifically, no mass, hemorrhage or CT evidence of an acute  vascular insult. 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 Otherwise no enhancing abnormalities.     3. No interval changes when compared to the previous head CT  11/4/2018.     4. Persistent findings:  - Left basal ganglia chronic lacunar insult versus dilated  perivascular space.  - Cerebellar 0.3 cm ectopia. No Chiari I malformation.       Documentation reviewed:  Reviewed prior records, Reviewed home medications.    Condition:  Fair.       Impression and Plan   Diagnosis       Acute seizures  -new onset  -CT head without contrast on 11/4/2018 with no acute abnormality  -seizure precautions  -Because of abnormal EEG neurology recommends discharge home on Keppra    Leukocytosis: resolved  -no source of infection identified  -likely secondary to seizures  -cxr = wnl  -Urine analysis on 11/4/2018=  NO indication of UTI    Tobacco abuse  -counseled      Hx of ETOH abuse  -pt reports he has not drank heavy in 10+ years  -Thiamine, folic acid and multivitamin daily         Discharge to home today in improved condition and on a regular diet  Discharge activity:  No driving until 6 months seizure free  Discharge meds: see discharge med rec  Followup with Dr. Casas in 2-3 months.     Course:  Improving.    Education and  Follow-up:       Counseled: Patient, Family, Regarding diagnosis, Regarding treatment, Regarding medications.

## 2022-05-04 NOTE — HISTORICAL [facility]ER
This is a historical note converted from [employer]. Formatting and pictures may have been removed.  Please reference [employer] for original formatting and attached multimedia. Chief Complaint  SEIZURE WHILE IN BED, GCS=15, NO PMH, C/O CP UPON TAKING DEEP BREATH( TRIAGE COPIED FROM DOWN TIME FORM ORIGINALLYWRITTEN BY SHAYLA EVANS)  Reason for Consultation  seizure  History of Present Illness  This is a 53-year-old man who was witnessed to have jerking movements and staring by his wife out of sleep.?bit tongue. right arm was extended and stiff according to wife,?patient reports. ?Episode was followed by postictal confusion.? EMS was called and patient was brought to the ER.? When he was evaluated in the ER he complained of neck pain, back pain, calf pain, chest pain.? He has no history of seizures.? He is not on any pro-convulsant meds.? History of alcohol use last night. 9beers, usually about 6 a day. Denies HA, fever, chills, rashes  ?  WBC on arrival 19.3  UDS neg  Etoh neg  CT head normal  Review of Systems  Constitutional: No fever, No chills, No weight loss.  Eye: No visual disturbances.  ENMT: No ear pain.  Respiratory: No shortness of breath  Cardiovascular: No chest pain  Gastrointestinal: No nausea, No vomiting.  Genitourinary: No dysuria  Immunologic: No immunocompromise  Musculoskeletal: No joint pain  Integumentary: No other significant skin complaints, No rash  Neurologic: Negative except as documented in history of present illness  Psychiatric: No depression  Physical Exam  Vitals & Measurements  T:?36.8? ?C (Oral)? TMIN:?36.5? ?C (Oral)? TMAX:?36.8? ?C (Oral)? HR:?74(Monitored)? RR:?20? BP:?118/66? SpO2:?99%? WT:?83?kg? WT:?84?kg?  General:?alert and oriented, No acute distress, no audible wheezes pulse intact. No edema  RRR no MRG  ?  ?   Cognition and Comprehension  Speech and language intact.  follow commands  speech fluent  attention intact  memory for recent events intact from history taking  affect  pleasant  fund of knowledge adequate  ?  ?   Cranial Nerves  II. Optic: Visual fields (Full to confrontation both eyes).  III, IV, VI. Oculomotor: Intact, Pupils equal, round and reactive to light, no nystagmus,  V. Trigeminal: Sensation of light touch (Normal)  VII. Facial: no facial asymmetry.  VIII, hearing intact to spoken voice  IX/X. Glossopharyngeal/ Vagus: Voice (normal).  XI. shoulder shrug normal  XII. Hypoglossal: Intact.  ?   Muscle Strength & Tone  Normal upper extremity tone,  Normal lower extremity tone.  Normal upper extremity strength  normal lower extremity strength  ?   Sensation  normal  Reflexes  hypo  ?   Coordination and Gait  Coordination and gait are normal.  ?  ?  ?  Assessment/Plan  Seizure,?Seizure-like activity  Seizure  ?new onset seizure. only provoking factor Etoh. Exam normal. check MRI and EEG.? if abnormality on either consider seizure meds.. If all normal, will hold on meds. Seizure precautions no driving for 6 months   Problem List/Past Medical History  Ongoing  No qualifying data  Historical  No qualifying data  Medications  Inpatient  Ativan, 2 mg= 1 mL, IV Push, q4hr, PRN  folic acid 1 mg oral tablet, 1 mg= 1 tab(s), Oral, Daily  labetalol, 20 mg= 4 mL, IV Push, q2hr, PRN  MVI with Minerals (Adult Tab), 1 tab(s), Oral, Daily  Protonix, 40 mg= 1 tab(s), Oral, Daily  Sodium Chloride 0.9% intravenous solution 1,000 mL, 1000 mL, IV  thiamine, 100 mg= 1 tab(s), Oral, Daily  Zofran, 4 mg= 2 mL, IV Push, q4hr, PRN  Home  No active home medications  Allergies  No Known Allergies  Social History  Tobacco  Current every day smoker, Cigarettes, Yes, 1 per day. 35 year(s). Ready to change: No. Household tobacco concerns: No., 11/04/2018  Unknown if ever smoked, No, 11/04/2018